# Patient Record
Sex: MALE | Race: WHITE
[De-identification: names, ages, dates, MRNs, and addresses within clinical notes are randomized per-mention and may not be internally consistent; named-entity substitution may affect disease eponyms.]

---

## 2018-09-17 ENCOUNTER — HOSPITAL ENCOUNTER (EMERGENCY)
Dept: HOSPITAL 76 - ED | Age: 28
Discharge: HOME | End: 2018-09-17
Payer: COMMERCIAL

## 2018-09-17 VITALS — DIASTOLIC BLOOD PRESSURE: 79 MMHG | SYSTOLIC BLOOD PRESSURE: 148 MMHG

## 2018-09-17 DIAGNOSIS — M54.42: Primary | ICD-10-CM

## 2018-09-17 DIAGNOSIS — M47.896: ICD-10-CM

## 2018-09-17 PROCEDURE — 72100 X-RAY EXAM L-S SPINE 2/3 VWS: CPT

## 2018-09-17 PROCEDURE — 99283 EMERGENCY DEPT VISIT LOW MDM: CPT

## 2018-09-17 NOTE — ED PHYSICIAN DOCUMENTATION
History of Present Illness





- Stated complaint


Stated Complaint: BACK PX





- Chief complaint


Chief Complaint: Back Pain





- Additonal information


Additional information: 





hx from pt


28 male


hx L spine godwin pars fx 2/2 trauma


txed with brace PT etc


was improved but with chronic numbness to lateral thigh and lower leg and 

radiculopathy


re-injured bending over to  a ball


severe pain


rad to leg as before - not new


numbness to L leg as before not new


no fever


no abd pain


no saddle anesthesia


no incont


no dysuria or hematuria





Review of Systems


Constitutional: denies: Fever, Chills


Cardiac: denies: Chest pain / pressure


Respiratory: denies: Dyspnea


GI: denies: Abdominal Pain


: denies: Dysuria, Incontinent, Hematuria


Musculoskeletal: reports: Back pain, Extremity pain (lateral L leg not new)


Neurologic: reports: Numbness (lateral L leg not new)





PD PAST MEDICAL HISTORY





- Present Medications


Home Medications: 


                                Ambulatory Orders











 Medication  Instructions  Recorded  Confirmed


 


Cyclobenzaprine [Flexeril] 10 mg PO TID PRN #20 tablet 09/17/18 


 


Lidocaine Patch 5% [Lidoderm Patch] 1 each TOP DAILY PRN #10 patch 09/17/18 


 


predniSONE [Deltasone] 20 mg PO [id number] #21 tab 09/17/18 














- Allergies


Allergies/Adverse Reactions: 


                                    Allergies











Allergy/AdvReac Type Severity Reaction Status Date / Time


 


No Known Drug Allergies Allergy   Verified 09/17/18 10:29














PD ED PE NORMAL





- Vitals


Vital signs reviewed: Yes





- Neck


Neck: Supple, no meningeal sign





- Cardiac


Cardiac: RRR





- Respiratory


Respiratory: No respiratory distress, Clear bilaterally





- Abdomen


Abdomen: Soft, Non tender, Other (no pulsatile mass)





- Back


Back: Other (severe TTP low L spine s warmmth swelling redness, limited ROM 2/2 

pain)





- Neuro


Neuro: Other (hip flex knee ext foot dorsi plantar and great toe ext all 5/5, + 

SLR L, patellar DTR 1+/4 godwin, no clonus, denies saddle anesthesia)





Results





- Vitals


Vitals: 





                               Vital Signs - 24 hr











  09/17/18





  10:26


 


Temperature 36.5 C


 


Heart Rate 92


 


Respiratory 22





Rate 


 


Blood Pressure 153/88 H


 


O2 Saturation 98








                                     Oxygen











O2 Source                      Room air

















- Rads (name of study)


  ** L spine


Radiology: See rad report (no acute, no listhesis)





PD MEDICAL DECISION MAKING





- Sepsis Event


Vital Signs: 





                               Vital Signs - 24 hr











  09/17/18





  10:26


 


Temperature 36.5 C


 


Heart Rate 92


 


Respiratory 22





Rate 


 


Blood Pressure 153/88 H


 


O2 Saturation 98








                                     Oxygen











O2 Source                      Room air

















Departure





- Departure


Disposition: 01 Home, Self Care


Clinical Impression: 


Back pain


Qualifiers:


 Back pain location: low back pain Chronicity: acute Back pain laterality: 

midline Sciatica presence: with sciatica Sciatica laterality: sciatica of left 

side Qualified Code(s): M54.42 - Lumbago with sciatica, left side





Instructions:  ED Sciatica, ED Low Back Pain Injury


Prescriptions: 


Cyclobenzaprine [Flexeril] 10 mg PO TID PRN #20 tablet


 PRN Reason: Spasms


Lidocaine Patch 5% [Lidoderm Patch] 1 each TOP DAILY PRN #10 patch


 PRN Reason: Pain


predniSONE [Deltasone] 20 mg PO [id number] #21 tab


Comments: 


Thankfully the xray does not show any new bony injury or slipping of the 

vertebral bodies due to your pars defect.


So it is OK for you to go home.


Recommend a steroid taped to decrease the nerve pain, muscle relaxants, and 

lidocaine patches applies to the area that hurts most for up to 12 hr a day


I wrote you a note not to lift at work.


Please follow up with your PMD - you may need PT again


If you still have your old brace you could wear that for support for the next 

few days


Forms:  Activity restrictions

## 2018-09-17 NOTE — XRAY REPORT
Reason:  prior pars defect, pain after bending

Procedure Date:  09/17/2018   

Accession Number:  [id number] / [id number]                    

Procedure:  XR  - Lumbar Spine 2 View CPT Code:  

 

FULL RESULT:

 

 

EXAM:

LUMBOSACRAL SPINE RADIOGRAPHY

 

EXAM DATE: 9/17/2018 11:58 AM.

 

CLINICAL HISTORY: Prior pars defect, pain after bending.

 

COMPARISONS: None.

 

TECHNIQUE: 2 views.

 

FINDINGS:

Alignment: Normal. No spondylolisthesis or scoliosis. 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 

spondylolysis.

 

Bones: Five non-rib-bearing lumbar vertebral bodies are present. No 

fractures or bone lesions.

 

Disks: Normal. Disk heights are maintained.

 

Facets: No degenerative changes.

 

Sacroiliac Joints: Unremarkable.

 

Soft Tissues: Normal. The visualized bowel gas pattern is normal.

IMPRESSION: No significant degenerative changes and no significant 

spondylolisthesis.

 

[facility]

## 2019-06-26 ENCOUNTER — HOSPITAL ENCOUNTER (EMERGENCY)
Dept: HOSPITAL 76 - ED | Age: 29
Discharge: HOME | End: 2019-06-26
Payer: COMMERCIAL

## 2019-06-26 VITALS — SYSTOLIC BLOOD PRESSURE: 147 MMHG | DIASTOLIC BLOOD PRESSURE: 91 MMHG
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
## 2019-06-26 DIAGNOSIS — S01.01XA: Primary | ICD-10-CM

## 2019-06-26 PROCEDURE — 99283 EMERGENCY DEPT VISIT LOW MDM: CPT

## 2019-06-26 PROCEDURE — 90471 IMMUNIZATION ADMIN: CPT

## 2019-06-26 PROCEDURE — 12002 RPR S/N/AX/GEN/TRNK2.6-7.5CM: CPT

## 2019-06-26 PROCEDURE — 99282 EMERGENCY DEPT VISIT SF MDM: CPT

## 2022-05-18 ENCOUNTER — HOSPITAL ENCOUNTER (OUTPATIENT)
Dept: HOSPITAL 76 - EMS | Age: 32
Discharge: OTHER INSTITUTION | End: 2022-05-18
Payer: COMMERCIAL

## 2022-05-18 ENCOUNTER — HOSPITAL ENCOUNTER (EMERGENCY)
Dept: HOSPITAL 76 - ED | Age: 32
Discharge: HOME | End: 2022-05-18
Payer: COMMERCIAL

## 2022-05-18 VITALS — SYSTOLIC BLOOD PRESSURE: 132 MMHG | DIASTOLIC BLOOD PRESSURE: 88 MMHG

## 2022-05-18 DIAGNOSIS — T58.02XA: ICD-10-CM

## 2022-05-18 DIAGNOSIS — F32.9: Primary | ICD-10-CM

## 2022-05-18 DIAGNOSIS — T14.91XA: ICD-10-CM

## 2022-05-18 DIAGNOSIS — T14.91XA: Primary | ICD-10-CM
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
## 2022-05-18 DIAGNOSIS — F10.129: ICD-10-CM

## 2022-05-18 LAB
ALBUMIN DIAFP-MCNC: 4.8 G/DL (ref 3.2–5.5)
ALBUMIN/GLOB SERPL: 1.4 {RATIO} (ref 1–2.2)
ALP SERPL-CCNC: 82 IU/L (ref 42–121)
ALT SERPL W P-5'-P-CCNC: 43 IU/L (ref 10–60)
AMPHET UR QL SCN: NEGATIVE
ANION GAP SERPL CALCULATED.4IONS-SCNC: 14 MMOL/L (ref 6–13)
APAP SERPL-MCNC: < 10 UG/ML (ref 10–30)
AST SERPL W P-5'-P-CCNC: 21 IU/L (ref 10–42)
BARBITURATES UR QL SCN>300 NG/ML: NEGATIVE
BASOPHILS NFR BLD AUTO: 0 10^3/UL (ref 0–0.1)
BASOPHILS NFR BLD AUTO: 0.5 %
BENZODIAZ UR QL SCN: NEGATIVE
BILIRUB BLD-MCNC: 0.9 MG/DL (ref 0.2–1)
BUN SERPL-MCNC: 10 MG/DL (ref 6–20)
CALCIUM UR-MCNC: 9.1 MG/DL (ref 8.5–10.3)
CHLORIDE SERPL-SCNC: 102 MMOL/L (ref 101–111)
CLARITY UR REFRACT.AUTO: CLEAR
CO2 SERPL-SCNC: 21 MMOL/L (ref 21–32)
COCAINE UR-SCNC: NEGATIVE UMOL/L
COHGB MFR BLDV: 3.3 % (ref 0–1.5)
CREAT SERPLBLD-SCNC: 0.9 MG/DL (ref 0.6–1.2)
EOSINOPHIL # BLD AUTO: 0.1 10^3/UL (ref 0–0.7)
EOSINOPHIL NFR BLD AUTO: 1.6 %
ERYTHROCYTE [DISTWIDTH] IN BLOOD BY AUTOMATED COUNT: 12.4 % (ref 12–15)
ETHANOL BLD-MCNC: 165.1 MG/DL
GFRSERPLBLD MDRD-ARVRAT: 98 ML/MIN/{1.73_M2} (ref 89–?)
GLOBULIN SER-MCNC: 3.3 G/DL (ref 2.1–4.2)
GLUCOSE SERPL-MCNC: 127 MG/DL (ref 70–100)
GLUCOSE UR QL STRIP.AUTO: NEGATIVE MG/DL
HCT VFR BLD AUTO: 45.2 % (ref 42–52)
HGB BLDA OXIMETRY-MCNC: 17.1 G/DL (ref 12–18)
HGB UR QL STRIP: 16.2 G/DL (ref 14–18)
KETONES UR QL STRIP.AUTO: NEGATIVE MG/DL
LIPASE SERPL-CCNC: 30 U/L (ref 22–51)
LYMPHOCYTES # SPEC AUTO: 2.1 10^3/UL (ref 1.5–3.5)
LYMPHOCYTES NFR BLD AUTO: 29.1 %
MCH RBC QN AUTO: 29.8 PG (ref 27–31)
MCHC RBC AUTO-ENTMCNC: 35.8 G/DL (ref 32–36)
MCV RBC AUTO: 83.1 FL (ref 80–94)
METHADONE UR QL SCN: NEGATIVE
METHAMPHET UR QL SCN: NEGATIVE
METHGB MFR BLDV: 0.4 % (ref 0–1.5)
MONOCYTES # BLD AUTO: 0.5 10^3/UL (ref 0–1)
MONOCYTES NFR BLD AUTO: 6.1 %
NEUTROPHILS # BLD AUTO: 4.6 10^3/UL (ref 1.5–6.6)
NEUTROPHILS # SNV AUTO: 7.4 X10^3/UL (ref 4.8–10.8)
NEUTROPHILS NFR BLD AUTO: 62.6 %
NITRITE UR QL STRIP.AUTO: NEGATIVE
NRBC # BLD AUTO: 0 /100WBC
NRBC # BLD AUTO: 0 X10^3/UL
OPIATES UR QL SCN: NEGATIVE
OXYHGB MFR BLDV: 94 % (ref 94–100)
PDW BLD AUTO: 10.3 FL (ref 7.4–11.4)
PH UR STRIP.AUTO: 5.5 PH (ref 5–7.5)
PLATELET # BLD: 274 10^3/UL (ref 130–450)
POTASSIUM SERPL-SCNC: 3.8 MMOL/L (ref 3.5–5)
PROT SPEC-MCNC: 8.2 G/DL (ref 6.7–8.2)
PROT UR STRIP.AUTO-MCNC: NEGATIVE MG/DL
RBC # UR STRIP.AUTO: NEGATIVE /UL
RBC MAR: 5.44 10^6/UL (ref 4.7–6.1)
SALICYLATES SERPL-MCNC: < 6 MG/DL
SODIUM SERPLBLD-SCNC: 137 MMOL/L (ref 135–145)
SP GR UR STRIP.AUTO: 1.01 (ref 1–1.03)
THC UR QL SCN: NEGATIVE
UROBILINOGEN UR QL STRIP.AUTO: (no result) E.U./DL
UROBILINOGEN UR STRIP.AUTO-MCNC: NEGATIVE MG/DL
VOLATILE DRUGS POS SERPL SCN: (no result)

## 2022-05-18 PROCEDURE — 70450 CT HEAD/BRAIN W/O DYE: CPT

## 2022-05-18 PROCEDURE — 82375 ASSAY CARBOXYHB QUANT: CPT

## 2022-05-18 PROCEDURE — 80307 DRUG TEST PRSMV CHEM ANLYZR: CPT

## 2022-05-18 PROCEDURE — 93005 ELECTROCARDIOGRAM TRACING: CPT

## 2022-05-18 PROCEDURE — 87635 SARS-COV-2 COVID-19 AMP PRB: CPT

## 2022-05-18 PROCEDURE — 81001 URINALYSIS AUTO W/SCOPE: CPT

## 2022-05-18 PROCEDURE — 80320 DRUG SCREEN QUANTALCOHOLS: CPT

## 2022-05-18 PROCEDURE — 85025 COMPLETE CBC W/AUTO DIFF WBC: CPT

## 2022-05-18 PROCEDURE — 80053 COMPREHEN METABOLIC PANEL: CPT

## 2022-05-18 PROCEDURE — 36415 COLL VENOUS BLD VENIPUNCTURE: CPT

## 2022-05-18 PROCEDURE — 80329 ANALGESICS NON-OPIOID 1 OR 2: CPT

## 2022-05-18 PROCEDURE — 80306 DRUG TEST PRSMV INSTRMNT: CPT

## 2022-05-18 PROCEDURE — 83690 ASSAY OF LIPASE: CPT

## 2022-05-18 PROCEDURE — 87086 URINE CULTURE/COLONY COUNT: CPT

## 2022-05-18 PROCEDURE — 84443 ASSAY THYROID STIM HORMONE: CPT

## 2022-05-18 PROCEDURE — 72125 CT NECK SPINE W/O DYE: CPT

## 2022-05-18 PROCEDURE — 81003 URINALYSIS AUTO W/O SCOPE: CPT

## 2022-05-18 PROCEDURE — 99284 EMERGENCY DEPT VISIT MOD MDM: CPT
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
## 2022-05-18 NOTE — CT REPORT
PROCEDURE:  HEAD WO

 

INDICATIONS:  head injury ETOH

 

TECHNIQUE:  

Noncontrast 4.5 mm thick angled axial sections acquired from the foramen magnum to the vertex.  For r
adiation dose reduction, the following was used:  automated exposure control, adjustment of mA and/or
 kV according to patient size.

 

COMPARISON:  None.

 

FINDINGS:  

Image quality:  Excellent.  

 

CSF spaces:  Basal cisterns are patent.  No extra-axial fluid collections.  Ventricles are normal in 
size and shape.  

 

Brain:  No midline shift.  No intracranial masses or hemorrhage.  Gray-white matter interface is norm
al.  

 

Skull and face:  Calvarium and visualized facial bones are intact, without suspicious lesions.  

 

Sinuses:  Visualized sinuses and mastoids are clear.  

 

IMPRESSION:  

No acute intracranial process.

 

The above findings are concordant with preliminary report.

 

Reviewed by: Iris Katz MD on 5/18/2022 10:45 AM PDT

Approved by: Iris Katz MD on 5/18/2022 10:45 AM PDT

 

 

Station ID:  [id number]
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
## 2022-05-18 NOTE — CT REPORT
PROCEDURE:  CERVICAL SPINE WO

 

INDICATIONS:  head injury ETOH

 

TECHNIQUE:  

Noncontrast 3 mm thick sections acquired from the skull base to the T4 level.  Sagittal and coronal r
eformats were then constructed.  For radiation dose reduction, the following was used:  automated exp
osure control, adjustment of mA and/or kV according to patient size.

 

COMPARISON:  None.

 

FINDINGS:  

Image quality:  Excellent.  

 

Bones:  No cervical fractures or dislocations.  There is a partially visualized lucency in the anteri
or aspect of the left first rib, not fully included within the field-of-view. Visualized superior rib
s are intact.  

 

Soft tissues:  Prevertebral soft tissues are normal in thickness.  No paravertebral hematomas.  No ap
ical pneumothoraces.  

 

IMPRESSION:  

Partially visualized lucency along the anterior aspect of the left first rib, not fully included with
in the field-of-view. Recommend correlation of point tenderness as fracture cannot be excluded.

 

The above findings were discussed with Dr. Home Coelho on 5/18/2022.

 

Reviewed by: Iris Katz MD on 5/18/2022 12:08 PM PDT

Approved by: Iris Katz MD on 5/18/2022 12:08 PM PDT

 

 

Station ID:  [id number]

## 2022-05-18 NOTE — ED PHYSICIAN DOCUMENTATION
ED Addendum





- Addendum


Addendum: 





05/18/22 12:13


Took call from radiologist over reading cervical spine CT with question of a 

first rib fracture.  He was reexamined there and is not tender and does not feel

injured there.  Awaiting DCR evaluation.


05/18/22 15:51


Seen by the DCR who is not detaining the patient.  An outpatient plan for safety

was formulated.